# Patient Record
Sex: FEMALE | NOT HISPANIC OR LATINO | Employment: FULL TIME | URBAN - METROPOLITAN AREA
[De-identification: names, ages, dates, MRNs, and addresses within clinical notes are randomized per-mention and may not be internally consistent; named-entity substitution may affect disease eponyms.]

---

## 2018-01-09 NOTE — CONSULTS
Chief Complaint  Chief Complaint Free Text Note Form: New patient here for MWM consult; Hx of sleep apnea and wearing a C-PAP nightly x 5 years  History of Present Illness  Free Text HPI: Obesity-  Severity: Moderate  Onset: 20+ years  Modifiers: Has tried different diet and exercise programs including medications supervised weight loss program at [facility] years ago but has become more difficult as she gets older and after menopause  Associated Symptoms: Comorbid conditions  Past Medical History    1  History of constipation (V12 79) (Z87 19)   2  History of dizziness (V13 89) (Z87 898)   3  History of shortness of breath (V13 89) (Z87 898)   4  History of urinary frequency (V13 09) (Z87 898)   5  History of Palpitations (785 1) (R00 2)  Active Problems And Past Medical History Reviewed: The active problems and past medical history were reviewed and updated today  Assessment    1  Obesity, Class II, BMI 35 0-39 9, with comorbidity (see actual BMI) (278 01) (E66 01)   2  Hypothyroidism, unspecified type (244 9) (E03 9)   3  Sleep apnea with use of continuous positive airway pressure (CPAP) (327 23) (G47 33)   4  Anxiety (300 00) (F41 9)   5  Stress incontinence, female (625 6) (N39 3)   6  Essential hypertension (401 9) (I10)    Discussion/Summary  Discussion Summary:   45 yo female w/ hypertension, ASHLYN, hypothyroidism, stress incontinence, anxiety and morbid obesity here to pursue medical weight management to improve weight and health      Obesity class 2:  -discussed options of conservative vs BHARGAVI program +/- meal replacement vs VLCD and bariatric surgery  -initial focus of 5-10% weight loss over 3-6 mos for improved health  -screening labs-request from PCP    Hypertension/palpitations: Stable  -Continue with metoprolol  -Would be cautious with sympathomimetics    ASHLYN:  -Continue with C Pap    Hypothyroidism:  -Continue with T4 as per prescribing provider  -Consider checking TSH    Stress incontinence:  -May improve with weight loss    Anxiety: Stable  -Continue with Lexapro    Return to clinic for new start visit for our intensive lifestyle intervention program once labs okay, 12 week follow-up  Patient also may consider attending bariatric surgery info seminar     Review of Systems  Focused-Female:   Constitutional: no fever  ENT: hoarseness  Cardiovascular: palpitations, but no chest pain  Respiratory: shortness of breath during exertion  Gastrointestinal: no abdominal pain  Genitourinary: incontinence  Musculoskeletal: arthralgias  Integumentary: no rashes  Neurological: no headache  Other Symptoms: Psych: +anxiety  Active Problems    1  Anxiety (300 00) (F41 9)   2  Essential hypertension (401 9) (I10)   3  Heartburn (787 1) (R12)   4  Hypothyroidism, unspecified type (244 9) (E03 9)   5  Obesity, Class II, BMI 35 0-39 9, with comorbidity (see actual BMI) (278 01) (E66 01)   6  Stress incontinence, female (625 6) (N39 3)    Surgical History    1  History of  Section  Surgical History Reviewed: The surgical history was reviewed and updated today  Family History  Mother    1  Family history of Hypertension, benign  Father    2  Family history of    3  Family history of cardiac disorder (V17 49) (Z82 49)   4  Family history of ST elevation myocardial infarction (STEMI), unspecified artery  Family History Reviewed: The family history was reviewed and updated today  Social History    · Never a smoker   · No alcohol use   · No drug use  Social History Reviewed: The social history was reviewed and updated today  Current Meds   1  Lexapro 10 MG Oral Tablet; Therapy: (Recorded:[id number]) to Recorded   2  Synthroid 125 MCG Oral Tablet; Therapy: (Yolanda Valle) to Recorded   3  Toprol XL 25 MG Oral Tablet Extended Release 24 Hour; Therapy: (Recorded:[id number]) to Recorded  Medication List Reviewed:    The medication list was reviewed and updated today  Allergies    1  Sulfa Drugs    Vitals  Vital Signs    Recorded: 11MRH2933 18:05BB   Systolic 004    Diastolic 86    Heart Rate 76    Respiration 15    Temperature 98 3 F    Height 5 ft 2 5 in    Weight 214 lb 11 2 oz    BMI Calculated 38 64    BSA Calculated 1 98    Waist Circumference  40 5   Neck Circumference  15     Physical Exam    Constitutional   General appearance: Abnormal   well developed and obese  Eyes No conjunctival pallor  Pulmonary   Respiratory effort: No increased work of breathing or signs of respiratory distress  Auscultation of lungs: Clear to auscultation  Cardiovascular   Auscultation of heart: Normal rate and rhythm, normal S1 and S2, without murmurs  Abdomen   Abdomen: Abnormal   The abdomen was obese  The abdomen was soft and nontender     Musculoskeletal   Gait and station: Normal     Psychiatric   Orientation to person, place, and time: Normal     Mood and affect: Normal          Signatures   Electronically signed by : HIRAM Arias ; Sep 22 2016  4:05PM EST                       (Author)

## 2018-01-10 NOTE — RESULT NOTES
Discussion/Summary  Cr/eGFR-WNL   Ok to start BHARGAVI program  12 week follow up     Signatures   Electronically signed by : HIRAM Mendes ; Oct 10 2016  7:48AM EST                       (Author)

## 2018-01-17 NOTE — MISCELLANEOUS
Message  Dear Yomaira Heard    We have been trying to reach out to you regarding your recent lab work  Please call the office at [phone] to discuss your recent labs and scheduling for the [facility]      Thank You    Sincerely,   Nasrin [facility]          Active Problems    1  Anxiety (300 00) (F41 9)   2  Essential hypertension (401 9) (I10)   3  Heartburn (787 1) (R12)   4  Hypothyroidism, unspecified type (244 9) (E03 9)   5  Obesity, Class II, BMI 35 0-39 9, with comorbidity (see actual BMI) (278 01) (E66 01)   6  Sleep apnea with use of continuous positive airway pressure (CPAP) (327 23) (G47 33)   7  Stress incontinence, female (625 6) (N39 3)    Current Meds   1  Lexapro 10 MG Oral Tablet (Escitalopram Oxalate); Therapy: (Recorded:[id number]) to Recorded   2  Synthroid 125 MCG Oral Tablet (Levothyroxine Sodium); Therapy: (Fransico Hyman) to Recorded   3  Toprol XL 25 MG Oral Tablet Extended Release 24 Hour (Metoprolol Succinate ER); Therapy: (Recorded:[id number]) to Recorded    Allergies    1   Sulfa Drugs    Signatures   Electronically signed by : Dereje Everett, ; Oct 12 2016  9:10AM EST                       (Author)

## 2020-03-17 ENCOUNTER — OFFICE VISIT (OUTPATIENT)
Dept: OBGYN CLINIC | Facility: CLINIC | Age: 55
End: 2020-03-17
Payer: COMMERCIAL

## 2020-03-17 VITALS
HEIGHT: 63 IN | HEART RATE: 76 BPM | BODY MASS INDEX: 39.12 KG/M2 | WEIGHT: 220.8 LBS | OXYGEN SATURATION: 99 % | SYSTOLIC BLOOD PRESSURE: 132 MMHG | DIASTOLIC BLOOD PRESSURE: 90 MMHG

## 2020-03-17 DIAGNOSIS — N84.0 ENDOMETRIAL POLYP: Primary | ICD-10-CM

## 2020-03-17 PROBLEM — K76.0 HEPATIC STEATOSIS: Status: ACTIVE | Noted: 2019-12-03

## 2020-03-17 PROBLEM — R10.2 SUPRAPUBIC PRESSURE: Status: ACTIVE | Noted: 2019-09-30

## 2020-03-17 PROBLEM — M15.9 GENERALIZED OSTEOARTHRITIS: Status: ACTIVE | Noted: 2019-04-11

## 2020-03-17 PROBLEM — H69.81 DYSFUNCTION OF RIGHT EUSTACHIAN TUBE: Status: ACTIVE | Noted: 2017-11-09

## 2020-03-17 PROBLEM — E11.9 TYPE 2 DIABETES MELLITUS WITHOUT COMPLICATION, WITHOUT LONG-TERM CURRENT USE OF INSULIN (HCC): Status: ACTIVE | Noted: 2019-09-30

## 2020-03-17 PROBLEM — M54.50 LOW BACK PAIN POTENTIALLY ASSOCIATED WITH RADICULOPATHY: Status: ACTIVE | Noted: 2020-01-22

## 2020-03-17 PROCEDURE — 99203 OFFICE O/P NEW LOW 30 MIN: CPT | Performed by: OBSTETRICS & GYNECOLOGY

## 2020-03-17 RX ORDER — LEVOTHYROXINE SODIUM 0.12 MG/1
TABLET ORAL
COMMUNITY

## 2020-03-17 RX ORDER — RIBOFLAVIN (VITAMIN B2) 100 MG
100 TABLET ORAL DAILY
COMMUNITY

## 2020-03-17 RX ORDER — ESCITALOPRAM OXALATE 10 MG/1
TABLET ORAL
COMMUNITY

## 2020-03-17 RX ORDER — METOPROLOL SUCCINATE 25 MG/1
TABLET, EXTENDED RELEASE ORAL
COMMUNITY

## 2020-03-17 RX ORDER — DIPHENOXYLATE HYDROCHLORIDE AND ATROPINE SULFATE 2.5; .025 MG/1; MG/1
1 TABLET ORAL DAILY
COMMUNITY

## 2020-03-17 NOTE — PROGRESS NOTES
Patient is a 60 y o  [id number] with No LMP recorded  who presents for second opinion  Pt reports that she has a h o ovarian cysts and was getting annual ultrasounds and they were stable  She reports this was discontinued about 5-10 years ago  She reports in the 2019 she developed pelvic pressure and pain and she saw her gynecologist  She had a pelvic u/s at that time  She had a 4 3 cm simple left ovarian cyst at that time  She was told to have a follow up 3-6 months later  However, she saw another physician within her doctor's group and she was told that she had an endometrial polyp and should schedule a D&C  U/S did not indicate a polyp at that time and simply showed thickened endometrium Pt underwent D&C in 2019 and she was not found to have a polyp  In 2020 she underwent repeat pelvic ultrasound as it had been previously scheduled and in follow up to her ovarian cyst   it showed an endometrial polyp and a thickened endometrium  Pt is concerned b/c she was told she had no polyp at her D&C  Her ovarian cyst was noted to be stable in size  She reports she emailed her physician and she was told she did not need any further follow up at this time  Pt is uncomfortable with this and is concerned she may have a polyp that was missed  Advised the patient it is possible to have a polyp now that was not there prior  If she would like reassurance she can undergo SIS to see if there is truly a polyp within her endometrium  Pt reports she would prefer this and will schedule the same through the office       Past Medical History:   Diagnosis Date    Gallstones     Urinary tract infection     Varicella        Past Surgical History:   Procedure Laterality Date     SECTION      x1    DILATION AND CURETTAGE OF UTERUS      miscarriage    WISDOM TOOTH EXTRACTION             OB History    Para Term  AB Living   5 2 2   3 2   SAB TAB Ectopic Multiple Live Births   3       2      # Outcome Date GA Lbr Shiva/2nd Weight Sex Delivery Anes PTL Lv   5 Term     M CS-LTranv   DONNA      Complications: Preeclampsia   4 Term     M Vag-Vacuum EPI  DONNA   3 SAB            2 SAB            1 SAB                      Current Outpatient Medications:     Ascorbic Acid (VITAMIN C) 100 MG tablet, Take 100 mg by mouth daily, Disp: , Rfl:     escitalopram (Lexapro) 10 mg tablet, Take by mouth, Disp: , Rfl:     levothyroxine (Synthroid) 125 mcg tablet, Take by mouth, Disp: , Rfl:     metoprolol succinate (Toprol XL) 25 mg 24 hr tablet, Take by mouth, Disp: , Rfl:     multivitamin (THERAGRAN) TABS, Take 1 tablet by mouth daily, Disp: , Rfl:     VITAMIN D, CHOLECALCIFEROL, PO, Take by mouth, Disp: , Rfl:     Allergies   Allergen Reactions    Ciprofloxacin Itching     Tongue felt "weird"    Sulfa Antibiotics Other (See Comments)     Inability to control leg and hands    Adhesive [Medical Tape] Rash       Social History     Socioeconomic History    Marital status: /Civil Union     Spouse name: Gina Gottlieb Number of children: 2    Years of education: None    Highest education level: Master's degree (e g , MA, MS, [place], MEd, MSW, ERICK)   Occupational History    Occupation: Teacher   Social Needs    Financial resource strain: None    Food insecurity:     Worry: None     Inability: None    Transportation needs:     Medical: None     Non-medical: None   Tobacco Use    Smoking status: Never Smoker    Smokeless tobacco: Never Used   Substance and Sexual Activity    Alcohol use: Never     Frequency: Never    Drug use: Never    Sexual activity: Not Currently     Partners: Male     Comment: lifetime partners: 1   Lifestyle    Physical activity:     Days per week: None     Minutes per session: None    Stress: None   Relationships    Social connections:     Talks on phone: None     Gets together: None     Attends Nondenominational service: None     Active member of club or organization: None     Attends meetings of clubs or organizations: None     Relationship status: None    Intimate partner violence:     Fear of current or ex partner: None     Emotionally abused: None     Physically abused: None     Forced sexual activity: None   Other Topics Concern    None   Social History Narrative    [employer]ism: Congregational    Accepts blood products       Family History   Problem Relation Age of Onset    Arthritis Mother     Hypertension Mother     Other Father         sepsis    Heart disease Father     Asthma Brother     Hyperlipidemia Brother     Hypertension Brother     Other Brother         pre-diabetes    Prostate cancer Brother     Breast cancer Neg Hx     Ovarian cancer Neg Hx     Colon cancer Neg Hx        Review of Systems   Constitutional: Negative for chills, fatigue, fever and unexpected weight change  HENT: Negative for congestion, mouth sores and sore throat  Respiratory: Negative for cough, chest tightness, shortness of breath and wheezing  Cardiovascular: Negative for chest pain and palpitations  Gastrointestinal: Negative for abdominal distention, abdominal pain, constipation, diarrhea, nausea and vomiting  Endocrine: Negative for cold intolerance and heat intolerance  Genitourinary: Negative for dyspareunia, dysuria, genital sores, menstrual problem, pelvic pain, vaginal bleeding, vaginal discharge and vaginal pain  Pelvic pressure   Musculoskeletal: Negative for arthralgias  Skin: Negative for color change and rash  Neurological: Negative for dizziness, light-headedness and headaches  Hematological: Negative for adenopathy  Blood pressure 132/90, pulse 76, height 5' 3" (1 6 m), weight 100 kg (220 lb 12 8 oz), SpO2 99 %  and Body mass index is 39 11 kg/m²  Physical Exam   Constitutional: She is oriented to person, place, and time  She appears well-developed and well-nourished  HENT:   Head: Normocephalic and atraumatic     Eyes: Conjunctivae and EOM are normal    Neck: Normal range of motion  Neck supple  No tracheal deviation present  No thyromegaly present  Pulmonary/Chest: Effort normal    Musculoskeletal: Normal range of motion  She exhibits no edema or tenderness  Lymphadenopathy:     She has no cervical adenopathy  Neurological: She is alert and oriented to person, place, and time  Skin: Skin is warm  No rash noted  No erythema  Psychiatric: She has a normal mood and affect  Her behavior is normal  Judgment and thought content normal            A/P:  Pt is a 60 y o   [id number] with      Diagnoses and all orders for this visit:    Endometrial polyp    unclear if present based on D&C and imaging less than 1 month apart from one another  Pt will proceed with SIS to further characterize